# Patient Record
Sex: FEMALE | Race: WHITE | NOT HISPANIC OR LATINO
[De-identification: names, ages, dates, MRNs, and addresses within clinical notes are randomized per-mention and may not be internally consistent; named-entity substitution may affect disease eponyms.]

---

## 2018-05-14 PROBLEM — Z00.00 ENCOUNTER FOR PREVENTIVE HEALTH EXAMINATION: Status: ACTIVE | Noted: 2018-05-14

## 2018-05-21 ENCOUNTER — APPOINTMENT (OUTPATIENT)
Dept: OPHTHALMOLOGY | Facility: CLINIC | Age: 73
End: 2018-05-21
Payer: MEDICARE

## 2018-05-21 PROCEDURE — 99204 OFFICE O/P NEW MOD 45 MIN: CPT

## 2018-06-01 ENCOUNTER — APPOINTMENT (OUTPATIENT)
Dept: OPHTHALMOLOGY | Facility: CLINIC | Age: 73
End: 2018-06-01
Payer: MEDICARE

## 2018-06-01 DIAGNOSIS — H51.11 CONVERGENCE INSUFFICIENCY: ICD-10-CM

## 2018-06-01 PROCEDURE — 92014 COMPRE OPH EXAM EST PT 1/>: CPT

## 2024-02-28 DIAGNOSIS — Z98.1 ARTHRODESIS STATUS: ICD-10-CM

## 2024-02-29 ENCOUNTER — APPOINTMENT (OUTPATIENT)
Dept: ORTHOPEDIC SURGERY | Facility: CLINIC | Age: 79
End: 2024-02-29
Payer: MEDICARE

## 2024-02-29 VITALS
DIASTOLIC BLOOD PRESSURE: 76 MMHG | BODY MASS INDEX: 22.5 KG/M2 | HEART RATE: 60 BPM | SYSTOLIC BLOOD PRESSURE: 133 MMHG | WEIGHT: 140 LBS | HEIGHT: 66 IN | OXYGEN SATURATION: 99 %

## 2024-02-29 DIAGNOSIS — R26.89 OTHER ABNORMALITIES OF GAIT AND MOBILITY: ICD-10-CM

## 2024-02-29 DIAGNOSIS — M40.202 UNSPECIFIED KYPHOSIS, CERVICAL REGION: ICD-10-CM

## 2024-02-29 PROCEDURE — 99214 OFFICE O/P EST MOD 30 MIN: CPT

## 2024-02-29 PROCEDURE — 72082 X-RAY EXAM ENTIRE SPI 2/3 VW: CPT

## 2024-03-04 ENCOUNTER — OUTPATIENT (OUTPATIENT)
Dept: OUTPATIENT SERVICES | Facility: HOSPITAL | Age: 79
LOS: 1 days | End: 2024-03-04
Payer: MEDICARE

## 2024-03-04 ENCOUNTER — RESULT REVIEW (OUTPATIENT)
Age: 79
End: 2024-03-04

## 2024-03-04 ENCOUNTER — APPOINTMENT (OUTPATIENT)
Dept: ORTHOPEDIC SURGERY | Facility: CLINIC | Age: 79
End: 2024-03-04
Payer: MEDICARE

## 2024-03-04 VITALS
HEIGHT: 66 IN | BODY MASS INDEX: 22.5 KG/M2 | DIASTOLIC BLOOD PRESSURE: 69 MMHG | OXYGEN SATURATION: 98 % | WEIGHT: 140 LBS | SYSTOLIC BLOOD PRESSURE: 123 MMHG | HEART RATE: 54 BPM

## 2024-03-04 DIAGNOSIS — M25.562 PAIN IN RIGHT KNEE: ICD-10-CM

## 2024-03-04 DIAGNOSIS — M21.969 UNSPECIFIED ACQUIRED DEFORMITY OF UNSPECIFIED LOWER LEG: ICD-10-CM

## 2024-03-04 DIAGNOSIS — Z96.651 PRESENCE OF RIGHT ARTIFICIAL KNEE JOINT: ICD-10-CM

## 2024-03-04 DIAGNOSIS — G89.29 PAIN IN RIGHT KNEE: ICD-10-CM

## 2024-03-04 DIAGNOSIS — M25.561 PAIN IN RIGHT KNEE: ICD-10-CM

## 2024-03-04 PROCEDURE — 73564 X-RAY EXAM KNEE 4 OR MORE: CPT | Mod: 26,50

## 2024-03-04 PROCEDURE — 73564 X-RAY EXAM KNEE 4 OR MORE: CPT

## 2024-03-04 PROCEDURE — 99214 OFFICE O/P EST MOD 30 MIN: CPT

## 2024-03-04 NOTE — DISCUSSION/SUMMARY
[de-identified] : I was present with the Fellow during the key portions of the history and exam. I discussed the case with the Fellow and agree with the findings and plan as documented in the [place] note, unless otherwise noted below.  Patient presents with chronic issues of difficulty ambulating for balance and frequent falls.  She has a right partial knee arthroplasty which appears well aligned with no obvious hardware complication loosening fracture or dislocation.  There is no instability to the knee.  There is mild arthritis of both knees but overall her joint space is well-maintained.  There is obvious severe ankle deformity and she is wearing a [place] type brace.  She appears to have some element of torticollis as well.  Her condition has deteriorated quite a bit over the last 4 to 5 years with unclear explanation.  She says she has had extensive workup by a multitude of physicians and that nothing to explain her condition has been found.  I think the knee is functioning well at this point in time and is not the etiology of her symptoms.  I think her knee pain and discomfort is related to the valgus moment that is being placed on it by the severe deformity ankle.  Condition given her systemic signs and symptoms I would refer her to neurology and rheumatology.  In addition to that referral and going to make a referral for foot and ankle for her.  In addition an MRI has been ordered to assess her spine and I strongly encouraged her to follow through with that plan.  I am certainly happy to see her back after these are accomplished but otherwise I recommend yearly surveillance of the knee.  All questions of the patient and her spouse answered.

## 2024-03-04 NOTE — REVIEW OF SYSTEMS
[Arthralgia] : arthralgia [Joint Pain] : joint pain [Joint Stiffness] : joint stiffness [Joint Swelling] : joint swelling [Feeling Tired] : fatigue [Fever] : no fever [Chills] : no chills

## 2024-03-04 NOTE — HISTORY OF PRESENT ILLNESS
[de-identified] :  VAISHALI ARAMBULA is a pleasant 78 year female who presents with bilateral knee pain and swelling that has been ongoing for several years. She had a right lateral partial UKA in 2016 at \[facility]"" followed by a spine fusion in 2018. Shortly after her spine fusion, she developed generalized inflammation and global joint pain and swelling in 2019. She continued to decline over the next several years leading her to be walker dependent at home and for short distances and wheelchair dependent for any distances. She is unable to take anti-inflammatory medications due to GERD and takes Tylenol to help with the symptoms. She has not been worked up for any autoimmune processes. She has been attending PT since 2018 for gait training and strengthening. She denies any fever/chills.

## 2024-03-04 NOTE — PHYSICAL EXAM
[de-identified] : General: Patient is a well-appearing female in no apparent distress. She is alert and oriented x 3. Vital signs are within normal limits.  No sign of fevers or infectious symptoms.   Hygiene: Good   HEENT: Atraumatic with no asymmetry.  Neck motion is limited. No overt hearing deficits.   Pulmonary: Breathing comfortably.   Gastrointestinal: Is overweight.   Psych: Responding appropriately with appropriate affect. Patient does not demonstrate tangential thought, perseveration or anxiety.   Vascular: No rashes or obvious skin abnormalities in either lower extremity. Capillary refill is <2sec. Good distal perfusion.   Neurovascular: Varicose veins absent.   Bilateral Knees Gait: wheelchair dependent    Inspection: Knees appear to have healing wounds No ulcerations observed   Palpation: Global tenderness to palpation about the knees   Range of Motion:         Right: 0-120                  Left: 0-120   Pain free ROM bilaterally. Left/right/bilateral knee stable to varus/valgus and ant/post stress   Right ankle fixed pes planovalgus [de-identified] :  X-rays taken today, including 4 views of the right knee, are reviewed. Intact knee lateral UKA. No evidence for any implant/hardware complication, loosening or fracture. No significant progression of arthritis in the medial and patellofemoral compartments

## 2024-03-04 NOTE — CONSULT LETTER
[FreeTextEntry2] : Dr Schwab [FreeTextEntry1] : I had the privilege of evaluating your patient today. I have enclosed my office note for your reference. If you have any questions, concerns or further input, please do not hesitate to contact me.  Thank you for allowing me to participate in the care of your patient.  Sincerely, Jose G Delgadillo MD

## 2024-03-05 NOTE — DISCUSSION/SUMMARY
[de-identified] : A lengthy discussion was had with the patient regarding her condition.   Rx MRI C spine.   Referral to Dr Delgadillo. The patient's questions were sought and answered satisfactorily.  I, Aurora March NP am acting as a scribe for Dr. Frank Schwab.

## 2024-03-05 NOTE — HISTORY OF PRESENT ILLNESS
[de-identified] : Pt presents with inability to hold her head up.  Had T2-pelvis fusion by Dr Schwab 10/2018.  Over the past couple of years, her neck has become more forward and to the right.  She is in wheelchair, uses walker as is unable to walk.  She loses balance and falls.    Denies radicular pain.  Has been in PT.

## 2024-03-05 NOTE — PHYSICAL EXAM
[de-identified] : NVI, overall deconditioning.  +chin on chest deformity [de-identified] : Scoliosis xray 2/29/2024:  Instrumentation intact, no halos about the screws, severe cervical kyphosis

## 2024-07-25 ENCOUNTER — APPOINTMENT (OUTPATIENT)
Dept: ORTHOPEDIC SURGERY | Facility: CLINIC | Age: 79
End: 2024-07-25

## 2024-07-25 VITALS
BODY MASS INDEX: 22.5 KG/M2 | HEART RATE: 59 BPM | WEIGHT: 140 LBS | SYSTOLIC BLOOD PRESSURE: 121 MMHG | DIASTOLIC BLOOD PRESSURE: 67 MMHG | OXYGEN SATURATION: 98 % | HEIGHT: 66 IN

## 2024-07-25 DIAGNOSIS — M40.202 UNSPECIFIED KYPHOSIS, CERVICAL REGION: ICD-10-CM

## 2024-07-25 DIAGNOSIS — R26.89 OTHER ABNORMALITIES OF GAIT AND MOBILITY: ICD-10-CM

## 2024-07-25 DIAGNOSIS — Z98.1 ARTHRODESIS STATUS: ICD-10-CM

## 2024-07-25 PROCEDURE — 99213 OFFICE O/P EST LOW 20 MIN: CPT

## 2024-07-25 PROCEDURE — 72082 X-RAY EXAM ENTIRE SPI 2/3 VW: CPT

## 2024-07-26 NOTE — END OF VISIT
[FreeTextEntry3] :   This note was written by Cyndy Marte PA-C, acting as a scribe for Dr. Frank Schwab. [Time Spent: ___ minutes] : I have spent [unfilled] minutes of time on the encounter.

## 2024-07-26 NOTE — HISTORY OF PRESENT ILLNESS
[de-identified] : 7/25/24: Patient returns for follow up. She reports continued inability to hold her head up, no significant change from last visit. Had T2-pelvis fusion by Dr Schwab 10/2018. Over the past couple of years, her neck has become more forward and to the right. She reports poor balance. Patient fell 3/2024 and sustained a right hip fracture (treated at [facility]). Reports chronic ankle pain/swelling, wearing [place] brace. She is using a wheelchair but able to ambulate with a walker. Patient is taking gabapentin 600mg daily. Has not been following with pain management.

## 2024-07-26 NOTE — DISCUSSION/SUMMARY
[de-identified] : A lengthy discussion was had with the patient regarding her condition. No surgical intervention indicated. She was encouraged to follow up with her pain management specialist. She was also referred to foot/ankle specialist near her home in [place]. The patient's questions were sought and answered satisfactorily.

## 2024-07-26 NOTE — PHYSICAL EXAM
[de-identified] : NVI, overall deconditioning. +chin on chest deformity. [de-identified] : Scoliosis xray 7/25/2024: Instrumentation intact, no halos about the screws, severe cervical kyphosis.